# Patient Record
Sex: FEMALE | Race: WHITE | NOT HISPANIC OR LATINO | Employment: OTHER | URBAN - METROPOLITAN AREA
[De-identification: names, ages, dates, MRNs, and addresses within clinical notes are randomized per-mention and may not be internally consistent; named-entity substitution may affect disease eponyms.]

---

## 2020-09-17 ENCOUNTER — APPOINTMENT (OUTPATIENT)
Dept: GENERAL RADIOLOGY | Facility: HOSPITAL | Age: 20
End: 2020-09-17

## 2020-09-17 ENCOUNTER — HOSPITAL ENCOUNTER (EMERGENCY)
Facility: HOSPITAL | Age: 20
Discharge: HOME | End: 2020-09-17
Admitting: EMERGENCY MEDICINE

## 2020-09-17 VITALS
DIASTOLIC BLOOD PRESSURE: 72 MMHG | TEMPERATURE: 97.9 F | SYSTOLIC BLOOD PRESSURE: 126 MMHG | OXYGEN SATURATION: 100 % | BODY MASS INDEX: 35.1 KG/M2 | WEIGHT: 178.79 LBS | HEART RATE: 80 BPM | HEIGHT: 60 IN | RESPIRATION RATE: 16 BRPM

## 2020-09-17 DIAGNOSIS — S47.2XXA CRUSHING INJURY OF LEFT SHOULDER, INITIAL ENCOUNTER: Primary | ICD-10-CM

## 2020-09-17 DIAGNOSIS — S46.912A STRAIN OF LEFT SHOULDER, INITIAL ENCOUNTER: ICD-10-CM

## 2020-09-17 PROCEDURE — 73030 X-RAY EXAM OF SHOULDER: CPT

## 2020-09-17 PROCEDURE — 99283 EMERGENCY DEPT VISIT LOW MDM: CPT

## 2020-09-17 RX ORDER — NAPROXEN 500 MG/1
500 TABLET ORAL 2 TIMES DAILY WITH MEALS
Qty: 20 TABLET | Refills: 0 | Status: SHIPPED | OUTPATIENT
Start: 2020-09-17 | End: 2020-09-17 | Stop reason: SDUPTHER

## 2020-09-17 RX ORDER — NAPROXEN 500 MG/1
500 TABLET ORAL 2 TIMES DAILY WITH MEALS
Qty: 20 TABLET | Refills: 0 | Status: SHIPPED | OUTPATIENT
Start: 2020-09-17

## 2020-09-17 NOTE — DISCHARGE INSTRUCTIONS
Use shoulder sling for the next 5 to 7 days as needed for pain.  Perform range of motion exercises 2-3 times daily apply ice for 20 minutes at a time for the next 72 hours to help with pain and swelling.  Take naproxen as needed for pain.  Do not mix with other NSAID such as ibuprofen diclofenac or Aleve.  Return to ED for any new or worsening symptoms including uncontrollable pain, coolness or paleness of your extremity.  Follow-up with orthopedics as listed below for further evaluation and management.    Follow-up with your primary care provider in 3-5 days.  If you do not have a primary care provider call [phone] for help in finding one, or you may follow up with [facility] at [phone].    Return to ED for any new or worsening symptoms

## 2020-09-17 NOTE — ED PROVIDER NOTES
Subjective   Patient is a 20-year-old female who presents with complaints of left shoulder pain after around 200 pounds of weight fell on her shoulder when she had it at about 90 degrees.  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  She denies head injury or LOC at the time of the incident.  Patient currently describes her pain as a constant sharp type pain that rates a 5/10 severity.  Denies any radiation of the pain from her shoulder.  She reports some initial paresthesias of her left hand and arm but states that has resolved.  States pain is worse with certain movements better with rest.  She denies any chest pain or shortness of breath denies any other alleviating or exacerbating factors.  Denies any neck neck or back pain, or headache          Review of Systems   Constitutional: Negative.    Respiratory: Negative.    Cardiovascular: Negative.    Musculoskeletal: Positive for arthralgias. Negative for back pain, joint swelling, neck pain and neck stiffness.   Skin: Negative.    Neurological: Negative.        History reviewed. No pertinent past medical history.    No Known Allergies    History reviewed. No pertinent surgical history.    History reviewed. No pertinent family history.    Social History     Socioeconomic History   • Marital status: Single     Spouse name: Not on file   • Number of children: Not on file   • Years of education: Not on file   • Highest education level: Not on file   Tobacco Use   • Smoking status: Light Tobacco Smoker   • Smokeless tobacco: Never Used   • Tobacco comment: socially   Substance and Sexual Activity   • Alcohol use: Yes   • Drug use: Never           Objective   Physical Exam  Vitals signs and nursing note reviewed.   Constitutional:       General: She is not in acute distress.     Appearance: She is well-developed.   HENT:      Head: Normocephalic and atraumatic. No raccoon eyes or Silveira's sign.      Mouth/Throat:      Mouth: Mucous  membranes are moist.      Pharynx: Oropharynx is clear.   Eyes:      General: No scleral icterus.     Extraocular Movements: Extraocular movements intact.      Pupils: Pupils are equal, round, and reactive to light.   Neck:      Musculoskeletal: Normal range of motion.   Cardiovascular:      Rate and Rhythm: Normal rate and regular rhythm.      Heart sounds: No murmur. No friction rub. No gallop.    Pulmonary:      Effort: Pulmonary effort is normal. No respiratory distress.      Breath sounds: Normal breath sounds. No stridor. No decreased breath sounds, wheezing, rhonchi or rales.   Chest:      Chest wall: No tenderness.   Musculoskeletal:      Left shoulder: She exhibits decreased range of motion, tenderness, deformity and pain. She exhibits no swelling, no laceration and no spasm.      Left elbow: Normal.      Cervical back: She exhibits normal range of motion and no tenderness.      Comments: Shoulder: There is no erythema induration or warmth noted.  The patient has decreased range of motion about the shoulder with negative drop-arm test, negative impingement sign.  Slight deformity noted the patient is neurovascularly intact.  The patient has intact radial medial ulnar and axillary nerves.  No tenderness noted across clavicle.  Normal range of motion flexion-extension supination pronation of left elbow with no significant pain.   Skin:     General: Skin is warm.      Capillary Refill: Capillary refill takes less than 2 seconds.      Findings: No rash.   Neurological:      General: No focal deficit present.      Mental Status: She is alert and oriented to person, place, and time.      GCS: GCS eye subscore is 4. GCS verbal subscore is 5. GCS motor subscore is 6.   Psychiatric:         Mood and Affect: Mood normal.         Behavior: Behavior normal. Behavior is cooperative.         Procedures           ED Course    /82 (BP Location: Right arm, Patient Position: Sitting)   Pulse 84   Temp 98 °F (36.7 °C)  "(Oral)   Resp 14   Ht 152.4 cm (60\")   Wt 81.1 kg (178 lb 12.7 oz)   SpO2 99%   BMI 34.92 kg/m²   Medications - No data to display  Labs Reviewed - No data to display  Xr Shoulder 2+ View Left    Result Date: 9/17/2020  Negative radiographs of the shoulder.  Electronically Signed By-Jc Patterson On:9/17/2020 5:10 PM This report was finalized on [id number] by  Jc Patterson, .                                           MDM  Number of Diagnoses or Management Options  Crushing injury of left shoulder, initial encounter:   Strain of left shoulder, initial encounter:   Diagnosis management comments: Chart Review:  Comorbidity: None  Differentials: Fracture dislocation contusion sprain strain     ;this list is not all inclusive and does not constitute the entirety of considered causes  Imaging: Was interpreted by physician and reviewed by myself:  Xr Shoulder 2+ View Left  Result Date: 9/17/2020  Negative radiographs of the shoulder.  Electronically Signed By-Jc Patterson On:9/17/2020 5:10 PM This report was finalized on [id number] by  Jc Patterson, .    Disposition/Treatment:  Appropriate PPE was worn during exam and throughout all encounters with the patient.  While in the ED patient was afebrile and appeared nontoxic in no respiratory distress.  She declined pain medication at this time.  X-ray showed no acute osseous abnormalities as above.  Patient was placed in a shoulder sling she was used in her lower neurovascular intact after placement good capillary refill.  Patient symptoms likely secondary to strain.  She was advised that she may need additional imaging if her pain continues.  Patient be given a consult for Ortho also advised follow-up with primary care provider for further evaluation and management.  She will be given naproxen for home.  Findings were discussed with the patient who voiced understanding and discharge instructions along with signs and symptoms to return to the ED.  Patient was " stable at time of discharge ambulatory with a steady gait and in agreement with plan       Amount and/or Complexity of Data Reviewed  Tests in the radiology section of CPT®: reviewed        Final diagnoses:   Crushing injury of left shoulder, initial encounter   Strain of left shoulder, initial encounter            Suzi Abel PA  09/17/20 [id number]